# Patient Record
Sex: MALE | Race: WHITE
[De-identification: names, ages, dates, MRNs, and addresses within clinical notes are randomized per-mention and may not be internally consistent; named-entity substitution may affect disease eponyms.]

---

## 2019-12-18 ENCOUNTER — HOSPITAL ENCOUNTER (OUTPATIENT)
Dept: HOSPITAL 46 - OPS | Age: 69
Discharge: HOME | End: 2019-12-18
Attending: INTERNAL MEDICINE
Payer: COMMERCIAL

## 2019-12-18 VITALS — BODY MASS INDEX: 30.48 KG/M2 | WEIGHT: 225 LBS | HEIGHT: 72 IN

## 2019-12-18 DIAGNOSIS — Z79.899: ICD-10-CM

## 2019-12-18 DIAGNOSIS — Z79.84: ICD-10-CM

## 2019-12-18 DIAGNOSIS — I50.9: ICD-10-CM

## 2019-12-18 DIAGNOSIS — F32.9: ICD-10-CM

## 2019-12-18 DIAGNOSIS — I13.0: ICD-10-CM

## 2019-12-18 DIAGNOSIS — Z79.01: ICD-10-CM

## 2019-12-18 DIAGNOSIS — E11.22: ICD-10-CM

## 2019-12-18 DIAGNOSIS — D72.829: ICD-10-CM

## 2019-12-18 DIAGNOSIS — D64.9: ICD-10-CM

## 2019-12-18 DIAGNOSIS — N18.9: ICD-10-CM

## 2019-12-18 DIAGNOSIS — C83.00: Primary | ICD-10-CM

## 2019-12-18 PROCEDURE — 079T3ZX DRAINAGE OF BONE MARROW, PERCUTANEOUS APPROACH, DIAGNOSTIC: ICD-10-PCS | Performed by: INTERNAL MEDICINE

## 2019-12-18 PROCEDURE — 07DR3ZX EXTRACTION OF ILIAC BONE MARROW, PERCUTANEOUS APPROACH, DIAGNOSTIC: ICD-10-PCS | Performed by: INTERNAL MEDICINE

## 2019-12-18 NOTE — NUR
12/18/19 1237 Ruchi Jones 1227 PT ARRIVED IN PACU AWAKE LAYING IN PRONE POSITION.
REPOSITIONED TO BACK AND WARM BLANKETS GIVEN. NO C/O'S.

## 2019-12-20 NOTE — PATH
[facility]
                                    [address] [place], [place]  [address]
_________________________________________________________________________________________
                                                                 Signed   
 
 
 
SPECIMEN(S): A BONE MARROW - CORE
SPECIMEN(S): B BONE MARROW - ASPIRATION
SPECIMEN(S): C COMPREHENSIVE FLOW CYTOMETRY ONLY, BM ASP EDTA
 
CLINICAL HISTORY:
68 y/o male with CKD, anemia, lymphocytosis and splenomegaly.  C85.10
 
DIAGNOSIS SUMMARY:
A.  Peripheral blood
-  Leukocytosis with circulating lymphoma cells.
-  Mild anemia.
B.  Bone marrow, aspirate smear, aspirate cell block, and trephine biopsy:
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 
hypercellular marrow. 
-  See Diagnostic Comment.
 
DIAGNOSTIC COMMENT:
Both the peripheral blood and bone marrow are involved by a low-grade mature 
B-cell lymphoproliferative neoplasm that is negative for both CD5 and CD10.  In 
the bone marrow, the lymphoma has a 
non-paratrabecular nodular infiltrative pattern as well as some interstitial 
infiltrative component.  The tumor cells are negative for cyclin D1 and SOX11, 
excluding mantle cell lymphoma.  In the 
peripheral blood, villous lymphocytes are evident.  Overall, the clinical 
presentation of splenomegaly and absence of lymphadenopathy as well as the 
morphologic and immunophenotypic features favor the 
diagnosis of splenic marginal zone B-cell lymphoma.  The differential diagnosis 
of splenic diffuse red pulp small B-cell lymphoma remains.  Because these two 
diagnoses have similar clinical 
presentation, morphologic, and immunophenotypic features, the distinction 
between the two entities would require examination of the spleen.  Both of 
these diagnoses have indolent clinical behavior. 
 
Although chronic lymphocytic leukemia/small lymphocytic lymphoma is considered 
in the differential diagnosis, the overall features do not favor this 
diagnosis.  Follicular lymphoma, hairy cell 
leukemia, and hairy cell leukemia variant are excluded.   Clinical correlation 
is recommended. 
Karyotype analysis and CLL panel FISH analysis and FISH analysis for 7q 
deletion are being performed.  Immunostains for LEF-1 and IgD are also 
prending.  The results will be reported by addendum. 
 
                                                                                    
_________________________________________________________________________________________
PATIENT NAME:     JACQUELINE KINGSLEY                   
MEDICAL RECORD #: [id number]            PATHOLOGY                     
          ACCT #: [id number]       ACCESSION #: [id number]     
DATE OF BIRTH:   [birth year]            REPORT #: [id number]       
PHYSICIAN:        STEVE PATHOLOGY              
PCP:              CLAUDIA RODRIGUEZ DO               
REPORT IS CONFIDENTIAL AND NOT TO BE RELEASED WITHOUT AUTHORIZATION
 
 
                                  [facility]
                                    [address] [place] [address]  [id number]
_________________________________________________________________________________________
                                                                 Signed   
 
 
The result is discussed with Dr. Park on 12/20/2019.
As part of [facility]' Quality Improvement Program, this case was 
reviewed by another member of our pathology staff. 
TTP:GP:smn:C1NR
 
PERIPHERAL BLOOD:
     HEMOGRAM (12/18/2019):  WBC 23.3 K/uL, RBC 4.22 M/uL, HGB 12.6 g/dL, HCT 
37.9%, MCV 89.9 fL,  K/uL. 
DIFFERENTIAL (manual):  28% neutrophils, 1% bands, 64% lymphocytes, 3% 
monocytes, 1% eosinophils, and 3% others.  Absolute lymphocyte count:  14.9 
K/uL. 
The red cells are mildly decreased in number and are normochromic and 
normocytic.  Anisocytosis and poikilocytosis are not increased.  Polychromasia 
is absent.  Leukocytes are increased in number and 
show an absolute mature lymphocytosis.  The lymphocytes are small to medium 
size with moderate amount of pale blue cytoplasm, smooth nuclear contour, and 
lacy to clumpy chromatin.  About 25% of the 
lymphocytes show a single prominent macronucleoli.  Cytoplasmic villous 
projections are present in subset of cells.  The remaining cells are mature 
neutrophils with normal morphology.  There is no 
monocytosis, basophilia, or eosinophilia.  Platelets are normal in number and 
show normal morphology. 
 
BONE MARROW:
BONE MARROW ASPIRATE SMEARS:  The bone marrow aspirate smears contain abundant 
cellular marrow particles.  The majority of cells present are atypical mature 
lymphocytes.  The remaining cells show 
maturing myeloid and erythroid precursors in various stages of maturation 
without overtly dysplastic change.  Blasts are not increased.  Plasma cells are 
rare.  Megakaryocytes are normal in number and 
show normal morphology.  A 200-cell differential count yields:  7% myelocytes, 
9% metamyelocytes, 10% neutrophils, 63% lymphocytes, and 11% erythroid 
precursors. 
BONE MARROW CLOT (ASPIRATE CELL BLOCK): AND TREPHINE BIOPSY:  A 2.2 cm 
decalcified trephine biopsy is available for review, showing a hypercellular 
marrow for age (70-80% cellular).  An interstitial 
to nodular infiltrate of atypical lymphocytes are present, accounting for about 
60% of total cells.  Large cells are rare.  The remaining cells show maturing 
granulocytes and erythroid precursors with 
 
an M:E ratio of approximately 2:1.  No increase in immature cells is noted.  
Megakaryocytes are normal in number and show normal morphology with even 
 
                                                                                    
_________________________________________________________________________________________
PATIENT NAME:     JACQUELINE KINGSLEY                   
MEDICAL RECORD #: [id number]            PATHOLOGY                     
          ACCT #: [id number]       ACCESSION #: [id number]     
DATE OF BIRTH:   [birth year]            REPORT #: [id number]       
PHYSICIAN:        STEVE UNDERWOOD              
PCP:              CLAUDIA RODRIGUEZ DO               
REPORT IS CONFIDENTIAL AND NOT TO BE RELEASED WITHOUT AUTHORIZATION
 
 
                                  [address]  [address]
_________________________________________________________________________________________
                                                                 Signed   
 
 
distribution.  Trabecular bone is normal.  The 
clot section contains a few cellular marrow particles with similar findings.
SPECIAL STAINS:
-  Iron (aspirate smear and clot section):  Stainable iron store is detected.  
Negative for ring sideroblasts. 
Appropriate positive control is reviewed.
IMMUNOHISTOCHEMISTRY, BLOCK A1:
-  CD3:  Scattered lymphocytes and band of lymphoid cells around lymphoid 
nodules are positive (T-cells). 
-  CD20:  B-cell nodules are strongly positive (60%).
-  PAX5:  B-cells are positive.
-  CD5:  B-cells are negative.
-  CD10:  B-cells are negative.
-  BCL-6:  B-cells are negative.
-  BCL-2:  B-cells are positive.
-  CD23:  B-cells are weakly positive.
-  CD43:  B-cells are negative.
-  Cyclin D1:  B-cells are negative.
-  SOX11:  B-cells are negative.
 
-  :  Scattered plasma cells are positive (approximately 3%).
-  CD21:  Follicular dendritic meshwork is present in lymphoid nodules.
-  Ki-67:  Low proliferation rate in B-cell infiltrate (less than 10%).
IN SITU HYBRIDIZATION, BLOCK A1:
-  Kappa:  B-cells are negative; scattered plasma cells are positive, polytypic 
pattern. 
-  Lambda:  B-cells are negative; scattered plasma cells are positive, 
polytypic pattern. 
TTP:smn
 
FLOW CYTOMETRY:
Flow cytometry analysis, bone marrow aspirate:
-  Mature B-cell lymphoma.  See Comment.

COMMENT:
About 16% of total events are kappa monotypic mature B-cells, consistent with a 
mature B-cell lymphoma.  In this case, the immunophenotype is non-specific for 
a lymphoma subtype.  Histologic 
correlation is needed for further characterization.
     FLOW CYTOMETRY ANALYSIS:
FLOW DIFFERENTIAL (% Total CD45 vs. SSC gating):  Myeloid 56%; Lymphoid 31%; 
Monocyte 7%; Dim CD45/Blast: 0.8%. 
 
                                                                                    
_________________________________________________________________________________________
PATIENT NAME:     JACQUELINE KINGSLEY                   
MEDICAL RECORD #: [id number]            PATHOLOGY                     
          ACCT #: [id number]       ACCESSION #: [id number]     
DATE OF BIRTH:   [birth year]            REPORT #: [id number]       
PHYSICIAN:        STEVE PATHOLOGY              
PCP:              CLAUDIA RODRIGUEZ DO               
REPORT IS CONFIDENTIAL AND NOT TO BE RELEASED WITHOUT AUTHORIZATION
 
 
                                  [facility]
                                    [address], [place]  [address]
_________________________________________________________________________________________
                                                                 Signed   
 
 
Cell Count: 3.2 x 10*3/uL.
POPULATION ANALYSIS:
BLASTS: Analysis of the dim CD45 gate demonstrates 0.8% myeloblasts and 1.4% 
hematogones. 
LYMPHOID CELLS: The lymphocyte gate comprises 31% of total events and includes 
46% T-cells with a CD4:CD8 ratio of 1.6:1 and normal pan T-cell antigen 
expression. 53% of lymphocytes are monoclonal 
 
kappa restricted B-cells (16% of total events) expressing CD45 MOD, CD19 MOD, 
CD20 MOD, FMC7 MOD, CD23 DIM-MOD (variable), CD22 MOD, KAPPA MOD while negative 
for CD5, CD38, CD10, , CD25, CD11c 
and .  The remainders are NK-cells.
MYELOID CELLS: The myeloid population comprises 56% of the total events. No 
aberrant  immunophenotypic expression is detected. 
MONOCYTES: The monocyte population comprises 7% of the total events.  Monocytes 
are not increased. Some increased CD15 expression is observed. 
PLASMA CELLS: 0.2% plasma cells are detected in the screening gate 
neg-dimCD45/CD38. Plasma cells are CD45 dim and positive for CD19. 
ANTIBODIES USED:
KAPPA, LAMBDA, CD20, CD10, CD19, CD23, CD38, FMC7, CD16, CD56, CD8, CD5, CD2, 
CD4, CD7, CD3, CD14, CD33, CD13, HLADR, CD34, , CD15, CD45, , CD25, 
CD11C, CD22,  : TOTAL ANTIBODIES USED: 
29.
JLR
FINAL DIAGNOSIS PERFORMED BY: Rosalina Best MD, Pathologist Dec 19 2019  2:36PM
 
CYTOGENETICS:
     Pending, to be reported by addendum.
 
FISH ANALYSIS:
     Pending, to be reported by addendum.
 
GROSS DESCRIPTION:
     The specimen is received in formalin in two parts.
A.  Labeled and designated "Zackary, bone core," the specimen consists of two 
bone core fragments, minute and 2.9 cm.  Submitted in (A1) following 
decalcification in Immun[facility] for 1.5 hours. 
B.  Labeled and designated "Zackary, clot," the specimen consists of a 1.8 x 
1.3 x 0.2 cm aggregate of blood clot.  Entirely submitted in (B1). 
tn:YUAN:kourtney
 
ADDITIONAL NOTES:
 
                                                                                    
_________________________________________________________________________________________
PATIENT NAME:     JACQUELINE KINGSLEY                   
MEDICAL RECORD #: [id number]            PATHOLOGY                     
          ACCT #: [id number]       ACCESSION #: [id number]     
DATE OF BIRTH:   [birth year]            REPORT #: [id number]       
PHYSICIAN:        STEVE PATHOLOGY              
PCP:              CLAUDIA RODRIGUEZ DO               
REPORT IS CONFIDENTIAL AND NOT TO BE RELEASED WITHOUT AUTHORIZATION
 
 
                                  [address]  [address]
_________________________________________________________________________________________
                                                                 Signed   
 
 
Immunohistochemical and/or in situ hybridization studies were performed on this 
case with the appropriate positive controls that react as expected.  This test 
was developed and its performance 
characteristics determined by [facility].  It has not been cleared or 
approved by the U.S. Food and Drug Administration.  The FDA has determined that 
such clearance or approval is not 
necessary.  This test is used for clinical purposes.  It should not be regarded 
as investigational or for research.  [facility] is certified under the 
Clinical Laboratory Improvement 
Amendments of 1988 (CLIA) as qualified to perform high complexity clinical 
laboratory testing. 
In this case, certain antibodies were performed by both immunohistochemistry 
and flow cytometry analysis because flow cytometry analysis did not fully 
explain all the light microscopic findings. 
Immunohistochemistry aided in the analysis.  Both methods are deemed medically 
necessary in this case. 
 
This test was developed and its performance characteristics determined by 
[facility].  It has not been cleared or approved by the US Food and 
Drug Administration. The FDA does not require this 
test to go through premarket FDA review. This test is used for clinical 
purposes.  It should not be regarded as investigational or for research.  This 
laboratory is certified under the Clinical 
Laboratory Improvement Amendments (CLIA) as qualified to perform high 
complexity clinical laboratory testing. 
 
PERFORMING LABORATORY:
The professional interpretation was performed by [facility], [address] [place] [address] (Medical Director:  Dominick Quinones D.O.; CLIA#:  [id number]). 
Professional interpretation was [place] [address] (Medical Director:  Dominick Quinones D.O.; CLIA#:  [id number]). 
 
IMAGES:
[id number]: [id number]
A: [id number]
 
Diagnostician:  Rosalina Best MD
Pathologist
Electronically Signed 12/20/2019
 
 
                                                                                    
_________________________________________________________________________________________
PATIENT NAME:     JACQUELINE KINGSLEY                   
MEDICAL RECORD #: [id number]            PATHOLOGY                     
          ACCT #: [id number]       ACCESSION #: [id number]     
DATE OF BIRTH:   [birth year]            REPORT #: [id number]       
PHYSICIAN:        STEVE PATHOLOGY              
PCP:              CLAUDIA RODRIGUEZ DO               
REPORT IS CONFIDENTIAL AND NOT TO BE RELEASED WITHOUT AUTHORIZATION
 
 
                                  [address] [place]  [address]
_________________________________________________________________________________________
                                                                 Signed   
 
 
Copies:                                
~
 
 
 
 
 
 
 
 
 
 
 
 
 
 
 
 
 
 
 
 
 
 
 
 
 
 
 
 
 
 
 
 
 
 
 
 
 
 
 
 
 
                                                                                    
_________________________________________________________________________________________
PATIENT NAME:     JACQUELINE KINGSLEY                   
MEDICAL RECORD #: [id number]            PATHOLOGY                     
          ACCT #: [id number]       ACCESSION #: [id number]     
DATE OF BIRTH:   [birth year]            REPORT #: [id number]       
PHYSICIAN:        STEVE PATHOLOGY              
PCP:              CLAUDIA RODRIGUEZ DO               
REPORT IS CONFIDENTIAL AND NOT TO BE RELEASED WITHOUT AUTHORIZATION